# Patient Record
Sex: FEMALE | Race: WHITE | NOT HISPANIC OR LATINO | Employment: FULL TIME | URBAN - METROPOLITAN AREA
[De-identification: names, ages, dates, MRNs, and addresses within clinical notes are randomized per-mention and may not be internally consistent; named-entity substitution may affect disease eponyms.]

---
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
## 2024-09-29 ENCOUNTER — HOSPITAL ENCOUNTER (EMERGENCY)
Facility: CLINIC | Age: 19
Discharge: HOME | End: 2024-09-29
Attending: EMERGENCY MEDICINE | Admitting: EMERGENCY MEDICINE
Payer: OTHER MISCELLANEOUS

## 2024-09-29 VITALS
SYSTOLIC BLOOD PRESSURE: 123 MMHG | RESPIRATION RATE: 18 BRPM | HEART RATE: 78 BPM | DIASTOLIC BLOOD PRESSURE: 80 MMHG | OXYGEN SATURATION: 99 % | WEIGHT: 152.12 LBS | TEMPERATURE: 97.8 F
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
## 2024-09-29 PROCEDURE — 90715 TDAP VACCINE 7 YRS/> IM: CPT | Performed by: EMERGENCY MEDICINE

## 2024-09-29 PROCEDURE — 250N000013 HC RX MED GY IP 250 OP 250 PS 637: Performed by: EMERGENCY MEDICINE

## 2024-09-29 PROCEDURE — 250N000011 HC RX IP 250 OP 636: Performed by: EMERGENCY MEDICINE

## 2024-09-29 PROCEDURE — 87340 HEPATITIS B SURFACE AG IA: CPT | Performed by: EMERGENCY MEDICINE

## 2024-09-29 PROCEDURE — 36415 COLL VENOUS BLD VENIPUNCTURE: CPT | Performed by: EMERGENCY MEDICINE

## 2024-09-29 PROCEDURE — 87522 HEPATITIS C REVRS TRNSCRPJ: CPT | Performed by: EMERGENCY MEDICINE

## 2024-09-29 PROCEDURE — 86803 HEPATITIS C AB TEST: CPT | Performed by: EMERGENCY MEDICINE

## 2024-09-29 PROCEDURE — 99284 EMERGENCY DEPT VISIT MOD MDM: CPT | Mod: 25

## 2024-09-29 PROCEDURE — 87389 HIV-1 AG W/HIV-1&-2 AB AG IA: CPT | Performed by: EMERGENCY MEDICINE

## 2024-09-29 PROCEDURE — 90471 IMMUNIZATION ADMIN: CPT | Performed by: EMERGENCY MEDICINE

## 2024-09-29 PROCEDURE — 86704 HEP B CORE ANTIBODY TOTAL: CPT | Performed by: EMERGENCY MEDICINE

## 2024-09-29 RX ORDER — EMTRICITABINE AND TENOFOVIR DISOPROXIL FUMARATE 200; 300 MG/1; MG/1
1 TABLET, FILM COATED ORAL ONCE
Status: COMPLETED | OUTPATIENT
Start: 2024-09-29 | End: 2024-09-29

## 2024-09-29 RX ORDER — EMTRICITABINE AND TENOFOVIR DISOPROXIL FUMARATE 200; 300 MG/1; MG/1
1 TABLET, FILM COATED ORAL DAILY
Qty: 28 TABLET | Refills: 0 | Status: SHIPPED | OUTPATIENT
Start: 2024-09-29 | End: 2024-10-27

## 2024-09-29 RX ADMIN — DOLUTEGRAVIR SODIUM 50 MG: 50 TABLET, FILM COATED ORAL at 22:35

## 2024-09-29 RX ADMIN — EMTRICITABINE AND TENOFOVIR DISOPROXIL FUMARATE 1 TABLET: 200; 300 TABLET, FILM COATED ORAL at 22:35

## 2024-09-29 RX ADMIN — CLOSTRIDIUM TETANI TOXOID ANTIGEN (FORMALDEHYDE INACTIVATED), CORYNEBACTERIUM DIPHTHERIAE TOXOID ANTIGEN (FORMALDEHYDE INACTIVATED), BORDETELLA PERTUSSIS TOXOID ANTIGEN (GLUTARALDEHYDE INACTIVATED), BORDETELLA PERTUSSIS FILAMENTOUS HEMAGGLUTININ ANTIGEN (FORMALDEHYDE INACTIVATED), BORDETELLA PERTUSSIS PERTACTIN ANTIGEN, AND BORDETELLA PERTUSSIS FIMBRIAE 2/3 ANTIGEN 0.5 ML: 5; 2; 2.5; 5; 3; 5 INJECTION, SUSPENSION INTRAMUSCULAR at 22:29

## 2024-09-29 ASSESSMENT — COLUMBIA-SUICIDE SEVERITY RATING SCALE - C-SSRS
1. IN THE PAST MONTH, HAVE YOU WISHED YOU WERE DEAD OR WISHED YOU COULD GO TO SLEEP AND NOT WAKE UP?: NO
2. HAVE YOU ACTUALLY HAD ANY THOUGHTS OF KILLING YOURSELF IN THE PAST MONTH?: NO
6. HAVE YOU EVER DONE ANYTHING, STARTED TO DO ANYTHING, OR PREPARED TO DO ANYTHING TO END YOUR LIFE?: NO

## 2024-09-29 ASSESSMENT — ACTIVITIES OF DAILY LIVING (ADL): ADLS_ACUITY_SCORE: 35

## 2024-09-30 ENCOUNTER — TELEPHONE (OUTPATIENT)
Dept: NURSING | Facility: CLINIC | Age: 19
End: 2024-09-30
Payer: COMMERCIAL

## 2024-09-30 LAB
HBV CORE AB SERPL QL IA: NONREACTIVE
HBV SURFACE AG SERPL QL IA: NONREACTIVE
HCV AB SERPL QL IA: NONREACTIVE
HIV 1+2 AB+HIV1 P24 AG SERPL QL IA: NONREACTIVE

## 2024-09-30 NOTE — TELEPHONE ENCOUNTER
[facility]    Reason for call: Lab Result Notification     Lab Result (including Rx patient on, if applicable).  If culture, copy of lab report at bottom.  Lab Result:   Component      Latest Ref Rng 9/29/2024  10:44 PM   Hep B Surface Agn      Nonreactive  Nonreactive      Component      Latest Ref Rng 9/29/2024  10:44 PM   Hepatitis B Core [place]      Nonreactive  Nonreactive      Component      Latest Ref Rng 9/29/2024  10:44 PM   Hepatitis C Antibody      Nonreactive  Nonreactive      Component      Latest Ref Rng 9/29/2024  10:44 PM   HIV Antigen Antibody Combo      Nonreactive  Nonreactive      Creatinine Level (mg/dl)   Creatinine   Date Value Ref Range Status   09/22/2013 0.52 0.39 - 0.73 mg/dL Final    Creatinine clearance (ml/min), if applicable    Creatinine clearance cannot be calculated (Patient's most recent lab result is older than the maximum 365 days allowed.)   Patient presented to [facility] ED on 9/29/2024 with a needlestick    RN Recommendations/Instructions per [place] ED lab result protocol:   [facility] ED lab result protocol utilized: Blood and body fluid exposure protocol    Unable to reach patient/caregiver.     Left voicemail message requesting a call back to [phone] between 9 a.m. and 5:30 p.m. for patient's ED/UC lab results.      Letter pended to be sent via [employer] mail.     Kortney Ledesma RN

## 2024-09-30 NOTE — ED TRIAGE NOTES
Presents to triage after getting a needle stick at work. 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

## 2024-09-30 NOTE — LETTER
September 30, 2024        Lavern Denton  [id number] [id number]          Dear Lavern Denton:    You were seen in the [facility] Emergency Department at [facility] on 9/29/2024.  We are unable to reach you by phone, so we are sending you this letter.     It is important that you call [facility] Emergency Department lab result nurse at [phone], as we have information to relay to you AND/OR we MAY have to make some changes in your treatment.    Best time to call back is between 9AM and 5:30PM, 7 days a week.      Sincerely,     [facility] Emergency Department Lab Result RN  [phone]

## 2024-09-30 NOTE — ED PROVIDER NOTES
Emergency Department Note      History of Present Illness     Chief Complaint   Needle Stick      HPI     Lavern Denton is a 19 year old female presents with needlestick.  Patient works at a .  She was cleaning a table and unbeknownst to her there was a uncapped used insulin needle.  She was poked into the palmar aspect of the left thumb.  There was a small amount of blood after the needle poke.  She cleaned the area copiously at work and presents to the ED.  Patient has received a full course of hepatitis B series vaccination.    Independent Historian   None    Review of External Notes   None    Past Medical History     Medical History and Problem List   Past Medical History:   Diagnosis Date    Asthma     Gastro-oesophageal reflux disease        Medications   Sertraline  Keppra      Surgical History   Past Surgical History:   Procedure Laterality Date    NO HISTORY OF SURGERY      TONSILLECTOMY, ADENOIDECTOMY, COMBINED  10/4/2012    Procedure: COMBINED TONSILLECTOMY, ADENOIDECTOMY;  TONSILLECTOMY, ADENOIDECTOMY;  Surgeon: Ganesh Velasquez MD;  Location: [facility] OR       Physical Exam     Patient Vitals for the past 24 hrs:   BP Temp Temp src Pulse Resp SpO2 Weight   09/29/24 2131 123/80 97.8  F (36.6  C) Temporal 78 18 99 % 69 kg (152 lb 1.9 oz)     Physical Exam      Eyes:    Conjunctiva normal  Neck:     Supple, no meningismus.     CV:     Regular rate and rhythm.      No murmurs, rubs or gallops.    PULM:    Clear to auscultation bilateral.       No respiratory distress.      Good air exchange.  MSK:     No gross deformity to all four extremities.   LYMPH:   No cervical lymphadenopathy.  NEURO:   Alert, good muscular tone, no atrophy.   Skin:    Warm, dry      Left thumb:      No erythema, warmth or palpable foreign body  Psych:    Mood is good and affect is appropriate.      Diagnostics     Lab Results   Labs Ordered and Resulted from Time of ED Arrival to Time of ED Departure - No data to  display    Imaging   No orders to display       Independent Interpretation   None    ED Course      Medications Administered   Medications   Tdap (tetanus-diphtheria-acell pertussis) (ADACEL) injection 0.5 mL (0.5 mLs Intramuscular $Given 9/29/24 2229)   emtricitabine-tenofovir (TRUVADA) 200-300 MG per tablet 1 tablet (1 tablet Oral $Given 9/29/24 2235)   dolutegravir (TIVICAY) tablet 50 mg (50 mg Oral $Given 9/29/24 2235)       Procedures   Procedures     Discussion of Management   None    ED Course        Additional Documentation  None    Medical Decision Making / Diagnosis       [facility]     Lavern Denton is a 19 year old female presents with an accidental needlestick with a used insulin needle.  Baseline testing was sent for HIV, hepatitis B and C.  She has completed her hepatitis B series. No indication for hepatitis B immunoglobulin.  Tetanus immunization was updated we had a prolonged discussion about the risk of needle stick injury and potential development of HIV.  Obviously this is a unknown source patient.  After discussing risks and benefits of HIV prophylaxis, patient opted for HIV prophylaxis with Truvada and Tivicay.  Patient will be discharged home on a 28-day course.  Patient to follow-up with PCP and return to ED for worsening symptoms.    Disposition   The patient was discharged.     Diagnosis     ICD-10-CM    1. Accidental hypodermic needlestick injury  W46.0XXA Hepatitis B Surface Antibody - Baseline     CANCELED: Hepatitis C antibody - Baseline     CANCELED: Hepatitis C RNA quantitative - Baseline     CANCELED: HIV Antigen Antibody Combo - Baseline     CANCELED: Hepatitis B core antibody - Baseline     CANCELED: Hepatitis B surface antigen - Baseline           Discharge Medications   Discharge Medication List as of 9/29/2024 10:48 PM        START taking these medications    Details   dolutegravir (TIVICAY) 50 MG tablet Take 1 tablet (50 mg) by mouth daily for 28 days., Disp-28 tablet, R-0,  E-Prescribe      emtricitabine-tenofovir (TRUVADA) 200-300 MG per tablet Take 1 tablet by mouth daily for 28 days., Disp-28 tablet, R-0, E-Prescribe               MD Chaim Tena Jeremiah R, MD  09/30/24 0129

## 2024-10-01 LAB — HCV RNA SERPL NAA+PROBE-ACNC: NOT DETECTED IU/ML

## 2024-10-01 NOTE — TELEPHONE ENCOUNTER
Patient returning call. Relayed below results.     Patient denies any S/S of infection of needlestick area. No additional questions.     Bigg Thompson RN  [facility]  Emergency Dept Lab Result RN  Ph# [phone]